# Patient Record
Sex: FEMALE | Race: WHITE
[De-identification: names, ages, dates, MRNs, and addresses within clinical notes are randomized per-mention and may not be internally consistent; named-entity substitution may affect disease eponyms.]

---

## 2022-12-06 ENCOUNTER — HOSPITAL ENCOUNTER (OUTPATIENT)
Dept: HOSPITAL 75 - PREOP | Age: 57
LOS: 2 days | Discharge: HOME | End: 2022-12-08
Attending: SURGERY
Payer: SELF-PAY

## 2022-12-06 VITALS — HEIGHT: 64.02 IN | BODY MASS INDEX: 27.33 KG/M2 | WEIGHT: 160.06 LBS

## 2022-12-06 DIAGNOSIS — Z01.818: Primary | ICD-10-CM

## 2022-12-13 ENCOUNTER — HOSPITAL ENCOUNTER (OUTPATIENT)
Dept: HOSPITAL 75 - ENDO | Age: 57
Discharge: HOME | End: 2022-12-13
Attending: SURGERY
Payer: SELF-PAY

## 2022-12-13 VITALS — SYSTOLIC BLOOD PRESSURE: 114 MMHG | DIASTOLIC BLOOD PRESSURE: 77 MMHG

## 2022-12-13 VITALS — DIASTOLIC BLOOD PRESSURE: 77 MMHG | SYSTOLIC BLOOD PRESSURE: 114 MMHG

## 2022-12-13 VITALS — HEIGHT: 64.17 IN | BODY MASS INDEX: 27.33 KG/M2 | WEIGHT: 160.06 LBS

## 2022-12-13 VITALS — DIASTOLIC BLOOD PRESSURE: 55 MMHG | SYSTOLIC BLOOD PRESSURE: 104 MMHG

## 2022-12-13 VITALS — SYSTOLIC BLOOD PRESSURE: 104 MMHG | DIASTOLIC BLOOD PRESSURE: 55 MMHG

## 2022-12-13 VITALS — DIASTOLIC BLOOD PRESSURE: 75 MMHG | SYSTOLIC BLOOD PRESSURE: 125 MMHG

## 2022-12-13 VITALS — SYSTOLIC BLOOD PRESSURE: 99 MMHG | DIASTOLIC BLOOD PRESSURE: 57 MMHG

## 2022-12-13 VITALS — SYSTOLIC BLOOD PRESSURE: 92 MMHG | DIASTOLIC BLOOD PRESSURE: 55 MMHG

## 2022-12-13 DIAGNOSIS — Z12.11: Primary | ICD-10-CM

## 2022-12-13 DIAGNOSIS — K57.30: ICD-10-CM

## 2022-12-13 DIAGNOSIS — D12.3: ICD-10-CM

## 2022-12-13 DIAGNOSIS — K63.5: ICD-10-CM

## 2022-12-13 PROCEDURE — 88305 TISSUE EXAM BY PATHOLOGIST: CPT

## 2022-12-13 NOTE — DISCHARGE INST-SIMPLE/STANDARD
Discharge Inst-Standard


Patient Instructions/Follow Up


Plan of Care/Instructions/FU:  


Follow-up with Dr. Jackson in 2 weeks


Activity as Tolerated:  Yes


Discharge Diet:  No Restrictions, Regular Diet











WALESKA JACKSON DO              Dec 13, 2022 14:45

## 2022-12-14 NOTE — OPERATIVE REPORT
DATE OF SERVICE: 12/13/2022



PREOPERATIVE DIAGNOSIS:

History of polyps.



POSTOPERATIVE DIAGNOSES:

Colon polyps, diverticulosis.



PROCEDURE:

Colonoscopy with hot biopsy polypectomy x1, snare polypectomy x1.



SURGEON:

Waleska Jackson DO.



ANESTHESIA:

Per CRNA.



ESTIMATED BLOOD LOSS:

None.



COMPLICATIONS:

None.



INDICATIONS

The patient is a 57-year-old female, needing screening colonoscopy.  She has a 

history of polyps.  She understands risks and benefits of procedure and wishes 

to proceed.  Consent was signed and in the chart.



DESCRIPTION OF PROCEDURE:

The patient was taken to endoscopy suite, placed in the left lateral recumbent 

position.  Timeout was performed.  Digital rectal exam was performed.  No 

palpable polyps, masses or ulcerations.  Scope was inserted in the rectum and 

advanced all the way to cecum with minimal difficulty.  Prep was adequate .  The

scope was then slowly retracted back.  Small polyp noted in the cecum, which hot

biopsy polypectomy was performed.  Scope was then continued to slowly retract 

back.  No polyps, masses, ulcerations within the ascending colon and in the 

transverse colon a larger polyp was present, which snare polypectomy was 

performed.  This was obtained for pathology.  Scope was then continuously 

retracted back.  No polyps, masses, ulcerations in remainder of the transverse, 

descending and sigmoid colon.  Through the sigmoid colon, diverticula were 

present.  Once in the rectum, scope was retroflexed noting no other pathology.  

Scope was returned to its normal position, slowly withdrawn until completely 

removed.  The patient tolerated the procedure well with no complications.  She 

was taken to recovery room in stable condition.



RECOMMENDATIONS:

The patient will need a repeat colonoscopy in 3 years for reevaluation.  Any 

issues before that be seen at that time.





Job ID: [id number]

DocumentID: [id number]

Dictated Date: 12/13/2022 15:01:20

Transcription Date: 12/14/2022 02:09:00

Dictated By: WALESKA JACKSON DO